# Patient Record
Sex: FEMALE | Race: WHITE | Employment: FULL TIME | RURAL
[De-identification: names, ages, dates, MRNs, and addresses within clinical notes are randomized per-mention and may not be internally consistent; named-entity substitution may affect disease eponyms.]

---

## 2024-06-14 ENCOUNTER — HOSPITAL ENCOUNTER (EMERGENCY)
Facility: HOSPITAL | Age: 34
Discharge: HOME | End: 2024-06-14
Attending: EMERGENCY MEDICINE
Payer: MEDICAID

## 2024-06-14 VITALS
OXYGEN SATURATION: 100 % | BODY MASS INDEX: 28.34 KG/M2 | TEMPERATURE: 97.8 F | DIASTOLIC BLOOD PRESSURE: 67 MMHG | RESPIRATION RATE: 16 BRPM | HEART RATE: 77 BPM | SYSTOLIC BLOOD PRESSURE: 105 MMHG | WEIGHT: 160 LBS

## 2024-06-14 DIAGNOSIS — M54.6 ACUTE MIDLINE THORACIC BACK PAIN: Primary | ICD-10-CM

## 2024-06-14 LAB
BILIRUB UR QL STRIP.AUTO: NEGATIVE
CLARITY UR: CLEAR
COLOR UR: YELLOW
GLUCOSE UR STRIP.AUTO-MCNC: NEGATIVE MG/DL
HCG UR QL: NEGATIVE
HGB UR QL STRIP.AUTO: NEGATIVE
KETONES UR STRIP.AUTO-MCNC: NEGATIVE MG/DL
LEUKOCYTE ESTERASE UR QL STRIP.AUTO: NEGATIVE
NITRITE UR QL STRIP.AUTO: NEGATIVE
PH UR STRIP.AUTO: 7 [PH] (ref 5–8)
PROT UR STRIP.AUTO-MCNC: NEGATIVE MG/DL
SP GR UR STRIP.AUTO: 1.01 (ref 1–1.03)
UA COMPLETE W REFLEX CULTURE PNL UR: NORMAL
UA DIPSTICK W REFLEX MICRO PNL UR: NORMAL
URN SPEC COLLECT METH UR: NORMAL
UROBILINOGEN UR STRIP-ACNC: 0.2 E.U./DL

## 2024-06-14 PROCEDURE — 81003 URINALYSIS AUTO W/O SCOPE: CPT

## 2024-06-14 PROCEDURE — 6360000002 HC RX W HCPCS: Performed by: EMERGENCY MEDICINE

## 2024-06-14 PROCEDURE — 6370000000 HC RX 637 (ALT 250 FOR IP): Performed by: EMERGENCY MEDICINE

## 2024-06-14 PROCEDURE — 96372 THER/PROPH/DIAG INJ SC/IM: CPT

## 2024-06-14 PROCEDURE — 84703 CHORIONIC GONADOTROPIN ASSAY: CPT

## 2024-06-14 PROCEDURE — 99284 EMERGENCY DEPT VISIT MOD MDM: CPT

## 2024-06-14 RX ORDER — LIDOCAINE 4 G/G
1 PATCH TOPICAL ONCE
Status: DISCONTINUED | OUTPATIENT
Start: 2024-06-14 | End: 2024-06-15 | Stop reason: HOSPADM

## 2024-06-14 RX ORDER — ORPHENADRINE CITRATE 100 MG/1
100 TABLET, EXTENDED RELEASE ORAL 2 TIMES DAILY
Qty: 20 TABLET | Refills: 0 | Status: SHIPPED | OUTPATIENT
Start: 2024-06-15 | End: 2024-06-25

## 2024-06-14 RX ORDER — LIDOCAINE 50 MG/G
1 PATCH TOPICAL DAILY
Qty: 10 PATCH | Refills: 0 | Status: SHIPPED | OUTPATIENT
Start: 2024-06-15 | End: 2024-06-25

## 2024-06-14 RX ORDER — KETOROLAC TROMETHAMINE 30 MG/ML
30 INJECTION, SOLUTION INTRAMUSCULAR; INTRAVENOUS ONCE
Status: COMPLETED | OUTPATIENT
Start: 2024-06-14 | End: 2024-06-14

## 2024-06-14 RX ORDER — ORPHENADRINE CITRATE 30 MG/ML
60 INJECTION INTRAMUSCULAR; INTRAVENOUS ONCE
Status: COMPLETED | OUTPATIENT
Start: 2024-06-14 | End: 2024-06-14

## 2024-06-14 RX ADMIN — ORPHENADRINE CITRATE 60 MG: 60 INJECTION INTRAMUSCULAR; INTRAVENOUS at 23:35

## 2024-06-14 RX ADMIN — KETOROLAC TROMETHAMINE 30 MG: 30 INJECTION, SOLUTION INTRAMUSCULAR; INTRAVENOUS at 23:35

## 2024-06-14 ASSESSMENT — LIFESTYLE VARIABLES
HOW OFTEN DO YOU HAVE A DRINK CONTAINING ALCOHOL: NEVER
HOW MANY STANDARD DRINKS CONTAINING ALCOHOL DO YOU HAVE ON A TYPICAL DAY: PATIENT DOES NOT DRINK

## 2024-06-15 NOTE — ED TRIAGE NOTES
Pt with complaints of back pain for the pat 3 days progressively getting worse. Pt states that she can not stand for her shirt to even touch her back. Patient states that the pain is in the middle of her back.

## 2024-06-15 NOTE — ED PROVIDER NOTES
[facility]  EMERGENCY DEPARTMENT ENCOUNTER        Pt Name: Catie Bales  MRN: [id number]  Birthdate 1990  Date of evaluation: 2024  Provider: Joslyn Caldwell MD  PCP: Varsha Choudhury APRN - NP  Note Started: 11:16 PM EDT 24    CHIEF COMPLAINT       Chief Complaint   Patient presents with    Back Pain       HISTORY OF PRESENT ILLNESS: 1 or more Elements     History from : Patient    Limitations to history : None    Catie Bales is a 33 y.o. female who presents to the emergency department with upper back pain.  Patient states that she has seen orthopedic surgery previously for similar back pain.  She states they did an x-ray but no other type of imaging.  She states for the last several days she has had this pain in her upper thoracic area on both sides.  She states that her skin is even sensitive to the touch.  She has been taking diclofenac as she has been previously prescribed this.  She states she is really uncomfortable and just looking for some relief.  She denies chest pain or shortness of breath, nausea, vomiting.  She has tried IcyHot as well.  The pain does not radiate to her chest or down her arms or legs or to her neck.  She states she is she has had back pain for quite a while and actually had a breast reduction surgery because they thought that was causing it.  She states that did help with her upper neck pain but not with her lower and mid back pain    Nursing Notes were all reviewed and agreed with or any disagreements were addressed in the HPI.    REVIEW OF SYSTEMS :      Review of Systems    10 systems reviewed and negative except as in HPI/MDM    SURGICAL HISTORY     Past Surgical History:   Procedure Laterality Date    BILATERAL SALPINGOOPHORECTOMY      BREAST REDUCTION SURGERY  2022     SECTION N/A     x4    TONSILLECTOMY         CURRENTMEDICATIONS       Previous Medications    BUPROPION (WELLBUTRIN XL) 150 MG EXTENDED  below findings:        Interpretation per the Radiologist below, if available at the time of this note:    No orders to display     No results found.    No results found.    PROCEDURES   Unless otherwise noted below, none     Procedures    CRITICAL CARE TIME       EMERGENCY DEPARTMENT COURSE and DIFFERENTIAL DIAGNOSIS/MDM:   Vitals:    Vitals:    06/14/24 2230 06/14/24 2300 06/14/24 2304 06/14/24 2330   BP: 108/82   105/67   Pulse:  77     Resp:   16    Temp: 97.8 °F (36.6 °C)      TempSrc: Oral      SpO2: 97%   100%   Weight:   72.6 kg (160 lb)        Patient was given the following medications:  Medications   lidocaine 4 % external patch 1 patch (1 patch TransDERmal Patch Applied 6/14/24 2335)   orphenadrine (NORFLEX) injection 60 mg (60 mg IntraMUSCular Given 6/14/24 2335)   ketorolac (TORADOL) injection 30 mg (30 mg IntraMUSCular Given 6/14/24 2335)            Is this patient to be included in the SEP-1 Core Measure due to severe sepsis or septic shock?       PAST MEDICAL HISTORY      has a past medical history of Anxiety, Depression, Heartburn, and Palpitations.     CC/HPI Summary, DDx, ED Course, and Reassessment: Catie Bales is a 33 y.o. female who presents to the emergency department with upper back pain.  Patient states that she has seen orthopedic surgery previously for similar back pain.  She states they did an x-ray but no other type of imaging.  She states for the last several days she has had this pain in her upper thoracic area on both sides.  She states that her skin is even sensitive to the touch.  She has been taking diclofenac as she has been previously prescribed this.  She states she is really uncomfortable and just looking for some relief.  She denies chest pain or shortness of breath, nausea, vomiting.  She has tried IcyHot as well.  The pain does not radiate to her chest or down her arms or legs or to her neck.  She states she is she has had back pain for quite a while and actually

## 2025-01-07 ENCOUNTER — OFFICE VISIT (OUTPATIENT)
Dept: OBSTETRICS AND GYNECOLOGY | Facility: CLINIC | Age: 35
End: 2025-01-07
Payer: COMMERCIAL

## 2025-01-07 VITALS
HEIGHT: 63 IN | WEIGHT: 150.2 LBS | BODY MASS INDEX: 26.61 KG/M2 | SYSTOLIC BLOOD PRESSURE: 100 MMHG | DIASTOLIC BLOOD PRESSURE: 60 MMHG

## 2025-01-07 DIAGNOSIS — N92.1 BREAKTHROUGH BLEEDING WITH IUD: Primary | ICD-10-CM

## 2025-01-07 DIAGNOSIS — Z97.5 BREAKTHROUGH BLEEDING WITH IUD: Primary | ICD-10-CM

## 2025-01-07 RX ORDER — DESOGESTREL AND ETHINYL ESTRADIOL 0.15-0.03
1 KIT ORAL DAILY
Qty: 28 TABLET | Refills: 2 | Status: SHIPPED | OUTPATIENT
Start: 2025-01-07 | End: 2026-01-07

## 2025-01-07 NOTE — PROGRESS NOTES
Subjective   Chief Complaint   Patient presents with    Vaginal Bleeding     Bleeding with IUD. TVS done today     Natalie Hartman is a 34 y.o. year old .  No LMP recorded. Patient has had an implant.  She presents to be seen because of breakthrough bleeding with Mirena IUD.  This was placed in 2023.  Patient has sort of daily spotting just enough to be bothersome but also recently had a 3-day heavy clotty type cycle that then reverted back to the daily spotting..     OTHER COMPLAINTS:  Nothing else    The following portions of the patient's history were reviewed and updated as appropriate:She  has a past medical history of Anesthesia complication, Anxiety, Back pain, Chlamydia, Depression, GERD (gastroesophageal reflux disease), History of exercise stress test (2019), Palpitations, Pharyngeal dysphagia, and PVC (premature ventricular contraction).  She does not have any pertinent problems on file.  She  has a past surgical history that includes  section; Tonsillectomy and adenoidectomy (); transesophageal echocardiogram (rylan) (2019); Esophagogastroduodenoscopy (N/A, 10/30/2019); Salpingectomy (Bilateral); Breast Reduction; and d & c hysteroscopy (N/A, 2023).  Her family history includes Heart disease in her sister; Hiatal hernia in her father and paternal grandmother; Hypertension in her father; No Known Problems in her mother and sister; Ulcers in her father.  She  reports that she has never smoked. She has never used smokeless tobacco. She reports that she does not drink alcohol and does not use drugs.  Current Outpatient Medications   Medication Sig Dispense Refill    Ascorbic Acid (Vitamin C) 500 MG chewable tablet Chew 1 tablet Daily.      buPROPion XL (WELLBUTRIN XL) 300 MG 24 hr tablet Take 1 tablet by mouth Daily.      escitalopram (LEXAPRO) 20 MG tablet Take 1 tablet by mouth Daily.      FeroSul 325 (65 Fe) MG tablet Take 1 tablet by mouth Daily With Breakfast.       HYDROcodone-acetaminophen (NORCO) 5-325 MG per tablet Take 1-2 tablets by mouth Every 6 (Six) Hours As Needed for Severe Pain 4 tablet 0    hydrOXYzine (ATARAX) 25 MG tablet TAKE 1/2 TO 1 TABLET BY MOUTH EVERY NIGHT AT BEDTIME AS NEEDED      ibuprofen (ADVIL,MOTRIN) 600 MG tablet Take 1 tablet by mouth Every 6 (Six) Hours As Needed for Mild Pain. 60 tablet 0    Loratadine 10 MG capsule Take  by mouth.      melatonin 5 MG tablet tablet Take  by mouth Daily.      pantoprazole (PROTONIX) 40 MG EC tablet TAKE ONE TABLET BY MOUTH EVERY MORNING 30 MINUTES BEFORE BREAKFAST 90 tablet 0    vitamin D (ERGOCALCIFEROL) 1.25 MG (39203 UT) capsule capsule Every 7 (Seven) Days.      Levonorgestrel (MIRENA) 20 MCG/DAY intrauterine device IUD Take to provider's office 1 each 0     No current facility-administered medications for this visit.     Current Outpatient Medications on File Prior to Visit   Medication Sig    Ascorbic Acid (Vitamin C) 500 MG chewable tablet Chew 1 tablet Daily.    buPROPion XL (WELLBUTRIN XL) 300 MG 24 hr tablet Take 1 tablet by mouth Daily.    escitalopram (LEXAPRO) 20 MG tablet Take 1 tablet by mouth Daily.    FeroSul 325 (65 Fe) MG tablet Take 1 tablet by mouth Daily With Breakfast.    HYDROcodone-acetaminophen (NORCO) 5-325 MG per tablet Take 1-2 tablets by mouth Every 6 (Six) Hours As Needed for Severe Pain    hydrOXYzine (ATARAX) 25 MG tablet TAKE 1/2 TO 1 TABLET BY MOUTH EVERY NIGHT AT BEDTIME AS NEEDED    ibuprofen (ADVIL,MOTRIN) 600 MG tablet Take 1 tablet by mouth Every 6 (Six) Hours As Needed for Mild Pain.    Loratadine 10 MG capsule Take  by mouth.    melatonin 5 MG tablet tablet Take  by mouth Daily.    pantoprazole (PROTONIX) 40 MG EC tablet TAKE ONE TABLET BY MOUTH EVERY MORNING 30 MINUTES BEFORE BREAKFAST    vitamin D (ERGOCALCIFEROL) 1.25 MG (56480 UT) capsule capsule Every 7 (Seven) Days.    Levonorgestrel (MIRENA) 20 MCG/DAY intrauterine device IUD Take to provider's office     No current  "facility-administered medications on file prior to visit.     She is allergic to omeprazole.    Social History    Tobacco Use      Smoking status: Never      Smokeless tobacco: Never    Review of Systems  Consitutional POS: nothing reported    NEG: anorexia or night sweats   Gastointestinal POS: nothing reported    NEG: bloating, change in bowel habits, melena, or reflux symptoms   Genitourinary POS: nothing reported    NEG: dysuria or hematuria   Integument POS: nothing reported    NEG: moles that are changing in size, shape, color or rashes   Breast POS: nothing reported    NEG: persistent breast lump, skin dimpling, or nipple discharge         Respiratory: negative  Cardiovascular: negative  GYN:   See HPI          Objective   /60   Ht 160 cm (63\")   Wt 68.1 kg (150 lb 3.2 oz)   BMI 26.61 kg/m²     General:  well developed; well nourished  no acute distress  mentation appropriate   Skin:  No suspicious lesions seen   Thyroid: normal to inspection and palpation   Lungs:  breathing is unlabored  clear to auscultation bilaterally   Heart:  regular rate and rhythm, S1, S2 normal, no murmur, click, rub or gallop   Breasts:  Not performed.   Abdomen: soft, non-tender; no masses  no umbilical or inguinal hernias are present  no hepato-splenomegaly   Pelvis: Not performed.     Psychiatric: Alert and oriented ×3, mood and affect appropriate  HEENT: Atraumatic, normocephalic, normal scleral icterus  Extremities: 2+ pulses bilaterally, no edema      Lab Review   CBC    Imaging   Anteverted uterus normal in size and shape.  IUD is in proper position.  There is some fluid within the endometrium but no overt thickening.  The adnexa are normal without masses.  No free fluid       Assessment   Breakthrough bleeding with IUD: It is in proper position.  Will trial OCP for about 3 months continually     Plan   As above.  Patient will follow-up in about 4 months if no response at which time we will have to consider " hysterectomy.      No orders of the defined types were placed in this encounter.         This note was electronically signed.      January 7, 2025

## 2025-01-14 ENCOUNTER — HOSPITAL ENCOUNTER (EMERGENCY)
Facility: HOSPITAL | Age: 35
Discharge: HOME | End: 2025-01-14
Attending: HOSPITALIST
Payer: MEDICAID

## 2025-01-14 VITALS
SYSTOLIC BLOOD PRESSURE: 125 MMHG | DIASTOLIC BLOOD PRESSURE: 80 MMHG | OXYGEN SATURATION: 100 % | BODY MASS INDEX: 26.22 KG/M2 | HEART RATE: 80 BPM | TEMPERATURE: 98.1 F | WEIGHT: 148 LBS | HEIGHT: 63 IN

## 2025-01-14 DIAGNOSIS — M79.662 PAIN OF LEFT CALF: Primary | ICD-10-CM

## 2025-01-14 PROCEDURE — 99282 EMERGENCY DEPT VISIT SF MDM: CPT

## 2025-01-14 ASSESSMENT — PAIN DESCRIPTION - ORIENTATION: ORIENTATION: LEFT

## 2025-01-14 ASSESSMENT — PAIN - FUNCTIONAL ASSESSMENT: PAIN_FUNCTIONAL_ASSESSMENT: 0-10

## 2025-01-14 ASSESSMENT — PAIN DESCRIPTION - LOCATION: LOCATION: LEG

## 2025-01-14 NOTE — ED TRIAGE NOTES
Patient c/o pain in lower left leg, began new birth control approx 1 week ago. Patient is concerned of a blood clot.

## 2025-01-14 NOTE — ED PROVIDER NOTES
[facility]  EMERGENCY DEPARTMENT ENCOUNTER        Pt Name: Catie Bales  MRN: [id number]  Birthdate 1990  Date of evaluation: 2025  Provider: Jose Arellano DO  PCP: Varsha Choudhury APRN - NP  Note Started: 6:40 PM EST 25    CHIEF COMPLAINT       Chief Complaint   Patient presents with    Leg Pain       HISTORY OF PRESENT ILLNESS: 1 or more Elements     History from : Patient    Limitations to history : None    Catie Bales is a 34 y.o. female who presents to the emergency department for left lower extremity/calf pain.  Patient states that she was started on birth control about a week ago.  She states ever since starting that she has had continuous left calf pain.  She states it hurts no matter what she does if she is standing on it if she is laying down she has discomfort to the proximal mid to lateral aspect of her calf on the left lower extremity.  She denies any swelling of the area.  Denies any redness.  Denies any pain or tenderness to touch but just constantly hurts.  Patient states that because of her anxiety she is concerned to look it up she is osteophytes we have a DVT or blood clot so she came to the emergency department for evaluation.  Denies chest pain or shortness of breath with the symptoms.  Denies any fall trauma or injury to the area.  Past medical history is pertinent for anxiety, depression, heartburn,    Nursing Notes were all reviewed and agreed with or any disagreements were addressed in the HPI.    REVIEW OF SYSTEMS :      Review of Systems    Review of systems reviewed and negative except as in HPI/MDM    PAST MEDICAL HISTORY     Past Medical History:   Diagnosis Date    Anxiety     Depression     Heartburn     Palpitations        SURGICAL HISTORY     Past Surgical History:   Procedure Laterality Date    BILATERAL SALPINGOOPHORECTOMY      BREAST REDUCTION SURGERY  2022     SECTION N/A     5    TONSILLECTOMY

## 2025-05-07 ENCOUNTER — OFFICE VISIT (OUTPATIENT)
Dept: OBSTETRICS AND GYNECOLOGY | Facility: CLINIC | Age: 35
End: 2025-05-07
Payer: COMMERCIAL

## 2025-05-07 VITALS
WEIGHT: 145 LBS | HEIGHT: 63 IN | BODY MASS INDEX: 25.69 KG/M2 | SYSTOLIC BLOOD PRESSURE: 100 MMHG | DIASTOLIC BLOOD PRESSURE: 62 MMHG

## 2025-05-07 DIAGNOSIS — N73.6 PELVIC ADHESIONS: ICD-10-CM

## 2025-05-07 DIAGNOSIS — N93.9 ABNORMAL UTERINE BLEEDING (AUB): Primary | ICD-10-CM

## 2025-05-07 RX ORDER — DESOGESTREL AND ETHINYL ESTRADIOL 0.15-0.03
1 KIT ORAL DAILY
Qty: 28 TABLET | Refills: 6 | Status: SHIPPED | OUTPATIENT
Start: 2025-05-07 | End: 2026-05-07

## 2025-05-07 NOTE — PROGRESS NOTES
Subjective   Chief Complaint   Patient presents with    Menstrual Problem     Follow up on breakthrough bleeding     Natalie Hartman is a 34 y.o. year old .  No LMP recorded. Patient has had an implant.  She presents to be seen because of follow-up breakthrough bleeding and menorrhagia associated with IUD.  IUD was placed with D&C just under 2 years ago for similar issues.  Ultrasound reviewed noted from January.  IUD in proper position.  Was placed on OCP.  Still having continual spotting though not heavy menses.  This has been going on for quite some time and patient desires definitive management.  She has had 5 prior C-sections and laparoscopic tubal report noted pelvic adhesive disease..     OTHER COMPLAINTS:  Nothing else    The following portions of the patient's history were reviewed and updated as appropriate:She  has a past medical history of Anesthesia complication, Anxiety, Back pain, Chlamydia, Depression, GERD (gastroesophageal reflux disease), History of exercise stress test (2019), Palpitations, Pharyngeal dysphagia, and PVC (premature ventricular contraction).  She does not have any pertinent problems on file.  She  has a past surgical history that includes  section; Tonsillectomy and adenoidectomy (); transesophageal echocardiogram (rylan) (2019); Esophagogastroduodenoscopy (N/A, 10/30/2019); Salpingectomy (Bilateral); Breast Reduction; and d & c hysteroscopy (N/A, 2023).  Her family history includes Arrhythmia in her maternal grandmother; Heart disease in her father and sister; Hiatal hernia in her father and paternal grandmother; Hypertension in her father; No Known Problems in her mother and sister; Ulcers in her father.  She  reports that she has never smoked. She has never used smokeless tobacco. She reports that she does not drink alcohol and does not use drugs.  Current Outpatient Medications   Medication Sig Dispense Refill    buPROPion XL (WELLBUTRIN XL)  300 MG 24 hr tablet Take 1 tablet by mouth Daily.      desogestrel-ethinyl estradiol (APRI) 0.15-30 MG-MCG per tablet Take 1 tablet by mouth Daily. 28 tablet 6    ibuprofen (ADVIL,MOTRIN) 600 MG tablet Take 1 tablet by mouth Every 6 (Six) Hours As Needed for Mild Pain. 60 tablet 0    melatonin 5 MG tablet tablet Take  by mouth Daily.      pantoprazole (PROTONIX) 40 MG EC tablet TAKE ONE TABLET BY MOUTH EVERY MORNING 30 MINUTES BEFORE BREAKFAST 90 tablet 0    Levonorgestrel (MIRENA) 20 MCG/DAY intrauterine device IUD Take to provider's office 1 each 0     No current facility-administered medications for this visit.     Current Outpatient Medications on File Prior to Visit   Medication Sig    buPROPion XL (WELLBUTRIN XL) 300 MG 24 hr tablet Take 1 tablet by mouth Daily.    ibuprofen (ADVIL,MOTRIN) 600 MG tablet Take 1 tablet by mouth Every 6 (Six) Hours As Needed for Mild Pain.    melatonin 5 MG tablet tablet Take  by mouth Daily.    pantoprazole (PROTONIX) 40 MG EC tablet TAKE ONE TABLET BY MOUTH EVERY MORNING 30 MINUTES BEFORE BREAKFAST    [DISCONTINUED] desogestrel-ethinyl estradiol (APRI) 0.15-30 MG-MCG per tablet Take 1 tablet by mouth Daily.    Levonorgestrel (MIRENA) 20 MCG/DAY intrauterine device IUD Take to provider's office    [DISCONTINUED] Ascorbic Acid (Vitamin C) 500 MG chewable tablet Chew 1 tablet Daily.    [DISCONTINUED] escitalopram (LEXAPRO) 20 MG tablet Take 1 tablet by mouth Daily.    [DISCONTINUED] FeroSul 325 (65 Fe) MG tablet Take 1 tablet by mouth Daily With Breakfast.    [DISCONTINUED] HYDROcodone-acetaminophen (NORCO) 5-325 MG per tablet Take 1-2 tablets by mouth Every 6 (Six) Hours As Needed for Severe Pain    [DISCONTINUED] hydrOXYzine (ATARAX) 25 MG tablet TAKE 1/2 TO 1 TABLET BY MOUTH EVERY NIGHT AT BEDTIME AS NEEDED    [DISCONTINUED] Loratadine 10 MG capsule Take  by mouth.    [DISCONTINUED] vitamin D (ERGOCALCIFEROL) 1.25 MG (36343 UT) capsule capsule Every 7 (Seven) Days.     No  "current facility-administered medications on file prior to visit.     She is allergic to omeprazole.    Social History    Tobacco Use      Smoking status: Never      Smokeless tobacco: Never    Review of Systems  Consitutional POS: nothing reported    NEG: anorexia or night sweats   Gastointestinal POS: nothing reported    NEG: bloating, change in bowel habits, melena, or reflux symptoms   Genitourinary POS: nothing reported    NEG: dysuria or hematuria   Integument POS: nothing reported    NEG: moles that are changing in size, shape, color or rashes   Breast POS: nothing reported    NEG: persistent breast lump, skin dimpling, or nipple discharge         Respiratory: negative  Cardiovascular: negative  GYN:   See HPI          Objective   /62   Ht 160 cm (63\")   Wt 65.8 kg (145 lb)   BMI 25.69 kg/m²     General:  well developed; well nourished  no acute distress  mentation appropriate   Skin:  No suspicious lesions seen   Thyroid: not examined   Lungs:  breathing is unlabored   Heart:  Not performed.   Breasts:  Not performed.   Abdomen: soft, non-tender; no masses  no umbilical or inguinal hernias are present  no hepato-splenomegaly   Pelvis: Not performed.     Psychiatric: Alert and oriented ×3, mood and affect appropriate  HEENT: Atraumatic, normocephalic, normal scleral icterus  Extremities: 2+ pulses bilaterally, no edema      Lab Review   No data reviewed    Imaging   Pelvic ultrasound report        Assessment   Abnormal uterine bleeding that is not responded to medical management  Prior  x 5  History of pelvic adhesive disease     Plan   Options discussed with patient.  She does desire definitive management.  Discussed the risks that can be associated.  Will send to the subspecialist at  for consultation.  Request has been made.  Continue OCP for now.      New Medications Ordered This Visit   Medications    desogestrel-ethinyl estradiol (APRI) 0.15-30 MG-MCG per tablet     Sig: Take 1 " tablet by mouth Daily.     Dispense:  28 tablet     Refill:  6          This note was electronically signed.      May 7, 2025

## 2025-05-22 ENCOUNTER — TRANSCRIBE ORDERS (OUTPATIENT)
Dept: GENERAL RADIOLOGY | Facility: HOSPITAL | Age: 35
End: 2025-05-22

## 2025-05-22 DIAGNOSIS — R00.2 INTERMITTENT PALPITATIONS: Primary | ICD-10-CM

## 2025-05-22 DIAGNOSIS — R06.09 OTHER FORMS OF DYSPNEA: ICD-10-CM

## 2025-05-28 ENCOUNTER — HOSPITAL ENCOUNTER (OUTPATIENT)
Facility: HOSPITAL | Age: 35
Discharge: HOME | End: 2025-05-30
Payer: MEDICAID

## 2025-05-28 DIAGNOSIS — R00.2 INTERMITTENT PALPITATIONS: ICD-10-CM

## 2025-05-28 PROCEDURE — 93225 XTRNL ECG REC<48 HRS REC: CPT

## 2025-06-09 DIAGNOSIS — R00.2 PALPITATIONS: Primary | ICD-10-CM

## 2025-06-09 LAB
CV ZIO BASELINE AVG BPM: 96
CV ZIO BASELINE BPM HIGH: 156
CV ZIO BASELINE BPM LOW: 57

## 2025-06-13 DIAGNOSIS — N73.6 PELVIC ADHESIONS: Primary | ICD-10-CM
